# Patient Record
Sex: FEMALE | Race: WHITE | NOT HISPANIC OR LATINO | Employment: OTHER | URBAN - METROPOLITAN AREA
[De-identification: names, ages, dates, MRNs, and addresses within clinical notes are randomized per-mention and may not be internally consistent; named-entity substitution may affect disease eponyms.]

---

## 2019-04-03 ENCOUNTER — TELEPHONE (OUTPATIENT)
Dept: ENDOCRINOLOGY | Facility: HOSPITAL | Age: 79
End: 2019-04-03

## 2019-04-03 ENCOUNTER — OFFICE VISIT (OUTPATIENT)
Dept: ENDOCRINOLOGY | Facility: HOSPITAL | Age: 79
End: 2019-04-03
Payer: MEDICARE

## 2019-04-03 VITALS
DIASTOLIC BLOOD PRESSURE: 74 MMHG | HEART RATE: 100 BPM | WEIGHT: 227.8 LBS | SYSTOLIC BLOOD PRESSURE: 120 MMHG | HEIGHT: 63 IN | BODY MASS INDEX: 40.36 KG/M2

## 2019-04-03 DIAGNOSIS — E21.3 HYPERPARATHYROIDISM (HCC): Primary | ICD-10-CM

## 2019-04-03 DIAGNOSIS — E55.9 VITAMIN D DEFICIENCY: ICD-10-CM

## 2019-04-03 DIAGNOSIS — M85.80 OSTEOPENIA, UNSPECIFIED LOCATION: ICD-10-CM

## 2019-04-03 PROCEDURE — 99204 OFFICE O/P NEW MOD 45 MIN: CPT | Performed by: INTERNAL MEDICINE

## 2019-04-03 RX ORDER — LANOLIN ALCOHOL/MO/W.PET/CERES
CREAM (GRAM) TOPICAL DAILY
COMMUNITY

## 2019-04-03 RX ORDER — ATORVASTATIN CALCIUM 20 MG/1
20 TABLET, FILM COATED ORAL DAILY
COMMUNITY

## 2019-04-03 RX ORDER — SPIRONOLACTONE 25 MG/1
25 TABLET ORAL DAILY
COMMUNITY

## 2019-04-03 RX ORDER — FUROSEMIDE 20 MG/1
20 TABLET ORAL DAILY
COMMUNITY

## 2019-04-03 RX ORDER — MELATONIN
1000 DAILY
COMMUNITY

## 2019-04-10 DIAGNOSIS — M85.80 OSTEOPENIA, UNSPECIFIED LOCATION: ICD-10-CM

## 2019-04-10 DIAGNOSIS — E21.3 HYPERPARATHYROIDISM (HCC): ICD-10-CM

## 2019-08-26 LAB — HBA1C MFR BLD HPLC: 6.3 %

## 2019-10-03 ENCOUNTER — OFFICE VISIT (OUTPATIENT)
Dept: ENDOCRINOLOGY | Facility: HOSPITAL | Age: 79
End: 2019-10-03
Payer: MEDICARE

## 2019-10-03 VITALS
SYSTOLIC BLOOD PRESSURE: 144 MMHG | HEART RATE: 80 BPM | DIASTOLIC BLOOD PRESSURE: 84 MMHG | WEIGHT: 210 LBS | HEIGHT: 63 IN | BODY MASS INDEX: 37.21 KG/M2

## 2019-10-03 DIAGNOSIS — E21.3 HYPERPARATHYROIDISM (HCC): Primary | ICD-10-CM

## 2019-10-03 DIAGNOSIS — E55.9 VITAMIN D DEFICIENCY: ICD-10-CM

## 2019-10-03 PROCEDURE — 99214 OFFICE O/P EST MOD 30 MIN: CPT | Performed by: INTERNAL MEDICINE

## 2019-10-03 RX ORDER — ALBUTEROL SULFATE 2.5 MG/3ML
SOLUTION RESPIRATORY (INHALATION)
Refills: 1 | COMMUNITY
Start: 2019-07-19

## 2019-10-03 NOTE — PROGRESS NOTES
10/3/2019    Assessment/Plan      Diagnoses and all orders for this visit:    Hyperparathyroidism ([address] )    Vitamin D deficiency        Assessment/Plan:  Hyperparathyroidism and vitamin-D deficiency:  I will acquire recent labs done at [address] last month  If they did not do a PTH level, I provided the patient with a PTH lab slip and we will call her to instructor to get this done  Did do well the labs needed, we are good for another 6 months and I provided her a lab slip to have a CMP, 25 hydroxy vitamin-D, PTH prior to that appointment  In, 2021 he may want to repeat kidney imaging as well as DEXA scan  CC:  Follow-up    History of Present Illness     HPI: Jesu Alonso is a 78y o  year old female with history of diabetes, hypertension, hyperlipidemia follows with her PCP for these presents for follow-up of hyperparathyroidism  She has history of kidney stones and in December of 2012 she underwent neck exploration with the right lower parathyroid resected  Surgical pathology suggested hypercellular parathyroid gland and normal parathyroid gland tissue consistent with adenoma  This was done at [facility]   I do not see that intraoperative PTH monitoring was performed  She also had an initial parathyroid operation in 1999  In August of 2015, PTH was mildly elevated at 73 and in June of 2017 PTH was normal at 55  DEXA scan in April 2019 showed osteopenia but no osteoporosis  She presents today overall feeling well  She continues to follow with Urology for history of kidney stones  Review of Systems   Constitutional: Negative for fatigue  HENT: Negative for trouble swallowing and voice change  Eyes: Negative for visual disturbance  Respiratory: Negative for shortness of breath  Cardiovascular: Negative for palpitations and leg swelling  Gastrointestinal: Negative for abdominal pain, nausea and vomiting  Endocrine: Negative for polydipsia and polyuria     Musculoskeletal: Negative for arthralgias and myalgias  Skin: Negative for rash  Neurological: Negative for dizziness, tremors and weakness  Hematological: Negative for adenopathy  Psychiatric/Behavioral: Negative for agitation and confusion  Historical Information   No past medical history on file  No past surgical history on file  Social History   Social History     Substance and Sexual Activity   Alcohol Use Not on file     Social History     Substance and Sexual Activity   Drug Use Not on file     Social History     Tobacco Use   Smoking Status Former Smoker    Last attempt to quit: 1986    Years since quittin 7   Smokeless Tobacco Never Used     Family History:   Family History   Problem Relation Age of Onset    Heart disease Mother     Cancer Father     Brain cancer Sister     Breast cancer Sister        Meds/Allergies   Current Outpatient Medications   Medication Sig Dispense Refill    atorvastatin (LIPITOR) 20 mg tablet Take 20 mg by mouth daily      cholecalciferol (VITAMIN D3) 1,000 units tablet Take 1,000 Units by mouth daily      cyanocobalamin (VITAMIN B-12) 1,000 mcg tablet Take by mouth daily      furosemide (LASIX) 20 mg tablet Take 20 mg by mouth daily      NON FORMULARY Lemon Grass Oil      spironolactone (ALDACTONE) 25 mg tablet Take 25 mg by mouth daily       No current facility-administered medications for this visit  No Known Allergies    Objective   Vitals: There were no vitals taken for this visit  Invasive Devices     None                 Physical Exam   Constitutional: She is oriented to person, place, and time  She appears well-developed and well-nourished  No distress  HENT:   Head: Normocephalic and atraumatic  Neck: Normal range of motion  Neck supple  No thyromegaly present  Cardiovascular: Normal rate and regular rhythm  Pulmonary/Chest: Effort normal and breath sounds normal  No respiratory distress  Abdominal: Soft   Bowel sounds are normal  Musculoskeletal: Normal range of motion  She exhibits no edema  Neurological: She is alert and oriented to person, place, and time  She exhibits normal muscle tone  Skin: Skin is warm and dry  No rash noted  She is not diaphoretic  Psychiatric: She has a normal mood and affect  Her behavior is normal    Vitals reviewed  The history was obtained from the review of the chart and from the patient  Lab Results:      Labs from [address] [place] [address] on 04/13/2019:  Twenty-four urine collection for calcium showed 238 mg per 24 hours with a total volume of 2100 cc  Labs from 04/08/2019 at [address] [place] [address] showed an ionized calcium 5 0, 1, 25 hydroxy vitamin-D was 41, 25 hydroxy vitamin-D was 44 9, calcium was 9 1, albumin 4 1, sodium 142, potassium 4 4, BUN 13, creatinine 0 79, GFR greater than 60, liver function within normal limits  Future Appointments   Date Time Provider Bryn Desai   10/3/2019  9:50 AM DO MACK [facility]       Portions of the record may have been created with voice recognition software  Occasional wrong word or "sound a like" substitutions may have occurred due to the inherent limitations of voice recognition software  Read the chart carefully and recognize, using context, where substitutions have occurred

## 2019-10-04 ENCOUNTER — TELEPHONE (OUTPATIENT)
Dept: ENDOCRINOLOGY | Facility: HOSPITAL | Age: 79
End: 2019-10-04

## 2019-10-04 NOTE — TELEPHONE ENCOUNTER
Please let patient know I got her labs from 08/26  PTH was checked and was elevated at 124  Because calcium remains normal in she is feeling well, we can continue to monitor over time  Repeat labs as planned prior to her next appointment in 6 months  She should have lab slips that a ordered at her recent appt  Labs from [facility] on 8/26/19:  WBC 5 6, Hgb 16, Hct 49 9, Plt 243, Na 144, K 4 2, BUN 12, Cr 0 83, GFR >60, Ca 9 8, Alb 4 3, LFTs WNL, Chol 189, , , HDL 45, A1C 6 3, TSH 2 66,  (14-64)

## 2022-05-17 ENCOUNTER — OFFICE VISIT (OUTPATIENT)
Dept: ENDOCRINOLOGY | Facility: HOSPITAL | Age: 82
End: 2022-05-17
Payer: MEDICARE

## 2022-05-17 VITALS
HEIGHT: 63 IN | DIASTOLIC BLOOD PRESSURE: 80 MMHG | WEIGHT: 203 LBS | SYSTOLIC BLOOD PRESSURE: 124 MMHG | BODY MASS INDEX: 35.97 KG/M2 | HEART RATE: 92 BPM

## 2022-05-17 DIAGNOSIS — E21.3 HYPERPARATHYROIDISM (HCC): Primary | ICD-10-CM

## 2022-05-17 DIAGNOSIS — E55.9 VITAMIN D DEFICIENCY: ICD-10-CM

## 2022-05-17 PROCEDURE — 99214 OFFICE O/P EST MOD 30 MIN: CPT | Performed by: NURSE PRACTITIONER

## 2022-05-17 RX ORDER — SIMVASTATIN 40 MG
40 TABLET ORAL DAILY
COMMUNITY
Start: 2022-04-14

## 2022-05-17 NOTE — PATIENT INSTRUCTIONS
Obtain DEXA Scan when possible  We will contact you with a review of recent lab work once it is received

## 2022-05-17 NOTE — PROGRESS NOTES
Jesu Alonso 80 y o  female MRN: [id number]    Encounter: [id number]      Assessment/Plan     Assessment: This is a 80y o -year-old female with hyperparathyroidism and vitamin-D deficiency  Plan:  1  Hyperparathyroidism:  There is no recent lab work  I will obtain his from her PCP and contact her with the results and a plan for further evaluation and/or treatment  Other than brittle nails, she denies any signs and symptoms of hyperparathyroidism/hypercalcemia  Check PTH, CMP and phosphorus prior to next office visit  2  Vitamin-D deficiency:  No recent lab work  She continues to supplement with 1000 units of vitamin D3 daily  Check 25 hydroxy vitamin-D level prior to next visit  3   Osteopenia:  She is currently due for DEXA scan which was ordered today  CC:  Hyperparathyroidism follow-up    History of Present Illness     HPI:  80y o  year old female with history of diabetes, hypertension, hyperlipidemia follows with her PCP for these presents for follow-up of hyperparathyroidism  She has history of kidney stones and in December of 2012 she underwent neck exploration with the right lower parathyroid resected  Surgical pathology suggested hypercellular parathyroid gland and normal parathyroid gland tissue consistent with adenoma  This was completed at [facility]    She also had an initial parathyroid operation in [address] [place] [address]  In August of 2015, PTH was mildly elevated at 73 and in June of 2017 PTH was normal at 55  DEXA scan in April 2019 showed osteopenia but no osteoporosis  She presents today overall feeling well  She continues to follow with Urology for history of kidney stones  Review of Systems   Constitutional: Negative  Negative for chills, fatigue and fever  HENT: Negative  Negative for trouble swallowing and voice change  Eyes: Negative  Negative for visual disturbance  Respiratory: Negative  Negative for chest tightness and shortness of breath      Cardiovascular: Negative  Negative for chest pain and palpitations  Gastrointestinal: Negative  Negative for abdominal pain, constipation, diarrhea and vomiting  Endocrine: Negative for cold intolerance, heat intolerance, polydipsia, polyphagia and polyuria  Genitourinary: Negative  Musculoskeletal: Negative  Skin: Negative  Brittle nails   Allergic/Immunologic: Negative  Neurological: Negative  Negative for dizziness, syncope, light-headedness and headaches  Hematological: Negative  Psychiatric/Behavioral: Negative  All other systems reviewed and are negative  Historical Information   No past medical history on file  No past surgical history on file  Social History   Social History     Substance and Sexual Activity   Alcohol Use None     Social History     Substance and Sexual Activity   Drug Use Not on file     Social History     Tobacco Use   Smoking Status Former Smoker    Quit date: 1986    Years since quittin 3   Smokeless Tobacco Never Used     Family History:   Family History   Problem Relation Age of Onset    Heart disease Mother     Cancer Father     Brain cancer Sister     Breast cancer Sister        Meds/Allergies   Current Outpatient Medications   Medication Sig Dispense Refill    albuterol (2 5 mg/3 mL) 0 083 % nebulizer solution inhale ONE vial via nebulizer FOUR TIMES DAILY AS NEEDED  1    atorvastatin (LIPITOR) 20 mg tablet Take 20 mg by mouth daily      cholecalciferol (VITAMIN D3) 1,000 units tablet Take 1,000 Units by mouth daily      cyanocobalamin (VITAMIN B-12) 1,000 mcg tablet Take by mouth daily      furosemide (LASIX) 20 mg tablet Take 20 mg by mouth daily      NON FORMULARY Lemon Grass Oil      spironolactone (ALDACTONE) 25 mg tablet Take 25 mg by mouth daily       No current facility-administered medications for this visit       No Known Allergies    Objective   Vitals: Blood pressure 124/80, pulse 92, height 5' 3" (1 6 m), weight 92 1 kg (203 lb)  Physical Exam  Vitals reviewed  Constitutional:       Appearance: She is well-developed  HENT:      Head: Normocephalic and atraumatic  Eyes:      Conjunctiva/sclera: Conjunctivae normal       Pupils: Pupils are equal, round, and reactive to light  Cardiovascular:      Rate and Rhythm: Normal rate and regular rhythm  Heart sounds: Normal heart sounds  Pulmonary:      Effort: Pulmonary effort is normal       Breath sounds: Normal breath sounds  Abdominal:      General: Bowel sounds are normal       Palpations: Abdomen is soft  Musculoskeletal:         General: Normal range of motion  Cervical back: Normal range of motion and neck supple  Skin:     General: Skin is warm and dry  Neurological:      Mental Status: She is alert and oriented to person, place, and time  Psychiatric:         Behavior: Behavior normal          Thought Content: Thought content normal          Judgment: Judgment normal            Results for orders placed in visit on 04/10/19    DXA bone density spine hip and pelvis      Portions of the record may have been created with voice recognition software  Occasional wrong word or "sound a like" substitutions may have occurred due to the inherent limitations of voice recognition software  Read the chart carefully and recognize, using context, where substitutions have occurred

## 2022-06-01 ENCOUNTER — TELEPHONE (OUTPATIENT)
Dept: ENDOCRINOLOGY | Facility: HOSPITAL | Age: 82
End: 2022-06-01

## 2022-06-02 NOTE — TELEPHONE ENCOUNTER
Tejal Ngo's recent DEXA scan performed at [facility] on May 31, 2022  Lumbar sacral spine remains normal in density  Right hip and right forearm continue to show osteopenia  Will further discuss at upcoming office visit with updated lab work

## 2022-06-03 DIAGNOSIS — E55.9 VITAMIN D DEFICIENCY: ICD-10-CM

## 2022-06-03 DIAGNOSIS — E21.3 HYPERPARATHYROIDISM (HCC): ICD-10-CM

## 2022-09-15 ENCOUNTER — OFFICE VISIT (OUTPATIENT)
Dept: ENDOCRINOLOGY | Facility: HOSPITAL | Age: 82
End: 2022-09-15
Payer: MEDICARE

## 2022-09-15 VITALS
BODY MASS INDEX: 37.88 KG/M2 | SYSTOLIC BLOOD PRESSURE: 130 MMHG | HEART RATE: 76 BPM | DIASTOLIC BLOOD PRESSURE: 88 MMHG | WEIGHT: 213.8 LBS | HEIGHT: 63 IN

## 2022-09-15 DIAGNOSIS — E55.9 VITAMIN D DEFICIENCY: ICD-10-CM

## 2022-09-15 DIAGNOSIS — E21.3 HYPERPARATHYROIDISM (HCC): Primary | ICD-10-CM

## 2022-09-15 PROCEDURE — 99214 OFFICE O/P EST MOD 30 MIN: CPT | Performed by: NURSE PRACTITIONER

## 2022-09-15 NOTE — PROGRESS NOTES
Jarekhouston Corral 80 y o  female MRN: [id number]    Encounter: [id number]      Assessment/Plan     Assessment: This is a 80y o -year-old female with hyperparathyroidism and vitamin-D deficiency  Plan:  1  Hyperparathyroidism:  PTH unavailable at the time of the visit  Calcium is normal   I will obtain his from her PCP and contact her with the results and a plan for further evaluation and/or treatment  Other than brittle nails, she denies any signs and symptoms of hyperparathyroidism/hypercalcemia  We will contact her with the results of her PTH lab work  Check PTH, CMP and phosphorus prior to next office visit      2  Vitamin-D deficiency:  Most recent vitamin-D level is sufficient at 51  She continues to supplement with 1000 units of vitamin D3 daily  Check 25 hydroxy vitamin-D level prior to next visit      3  Osteopenia: Recent DEXA scan  CC: Hyperparathyroidism follow-up    History of Present Illness     HPI:  80 y  o  year old female with history of diabetes, hypertension, hyperlipidemia follows with her PCP for these presents for follow-up of hyperparathyroidism  Víctor Varela has history of kidney stones and in December of 2012 she underwent neck exploration with the right lower parathyroid resected   Surgical pathology suggested hypercellular parathyroid gland and normal parathyroid gland tissue consistent with adenoma  This was completed at [facility]   Víctor Varela also had an initial parathyroid operation in [address] [place] [address]  Most recent calcium level from September 12, 2022 is 9 8 with an albumin of 4 0  Her vitamin-D level 51 8  DEXA scan in June 2022 showed osteopenia but no osteoporosis  She presents today overall feeling well   She continues to follow with Urology for history of kidney stones  Review of Systems   Constitutional: Negative  Negative for chills, fatigue and fever  HENT: Negative  Negative for trouble swallowing and voice change  Eyes: Negative  Negative for visual disturbance  Respiratory: Negative  Negative for chest tightness and shortness of breath  Cardiovascular: Negative  Negative for chest pain  Gastrointestinal: Negative  Negative for abdominal pain, constipation, diarrhea and vomiting  Endocrine: Negative for cold intolerance, heat intolerance, polydipsia, polyphagia and polyuria  Genitourinary: Negative  Musculoskeletal: Negative  Skin: Negative  Allergic/Immunologic: Negative  Neurological: Negative  Negative for dizziness, syncope, light-headedness and headaches  Hematological: Negative  Psychiatric/Behavioral: Negative  All other systems reviewed and are negative  Historical Information   No past medical history on file  No past surgical history on file    Social History   Social History     Substance and Sexual Activity   Alcohol Use None     Social History     Substance and Sexual Activity   Drug Use Not on file     Social History     Tobacco Use   Smoking Status Former Smoker    Quit date: 1986    Years since quittin 6   Smokeless Tobacco Never Used     Family History:   Family History   Problem Relation Age of Onset    Heart disease Mother     Cancer Father     Brain cancer Sister     Breast cancer Sister        Meds/Allergies   Current Outpatient Medications   Medication Sig Dispense Refill    albuterol (2 5 mg/3 mL) 0 083 % nebulizer solution inhale ONE vial via nebulizer FOUR TIMES DAILY AS NEEDED (Patient not taking: Reported on 2022)  1    atorvastatin (LIPITOR) 20 mg tablet Take 20 mg by mouth daily (Patient not taking: Reported on 2022)      cholecalciferol (VITAMIN D3) 1,000 units tablet Take 1,000 Units by mouth daily      cyanocobalamin (VITAMIN B-12) 1,000 mcg tablet Take by mouth daily      furosemide (LASIX) 20 mg tablet Take 20 mg by mouth daily (Patient not taking: Reported on 2022)      NON FORMULARY Lemon Grass Oil      simvastatin (ZOCOR) 40 mg tablet Take 40 mg by mouth in the morning   spironolactone (ALDACTONE) 25 mg tablet Take 25 mg by mouth daily (Patient not taking: Reported on 5/17/2022)       No current facility-administered medications for this visit  No Known Allergies    Objective   Vitals: There were no vitals taken for this visit  Physical Exam  Vitals reviewed  Constitutional:       Appearance: She is well-developed  HENT:      Head: Normocephalic and atraumatic  Eyes:      Conjunctiva/sclera: Conjunctivae normal       Pupils: Pupils are equal, round, and reactive to light  Cardiovascular:      Rate and Rhythm: Normal rate and regular rhythm  Heart sounds: Normal heart sounds  Pulmonary:      Effort: Pulmonary effort is normal       Breath sounds: Normal breath sounds  Abdominal:      General: Bowel sounds are normal       Palpations: Abdomen is soft  Musculoskeletal:         General: Normal range of motion  Cervical back: Normal range of motion and neck supple  Skin:     General: Skin is warm and dry  Neurological:      Mental Status: She is alert and oriented to person, place, and time  Psychiatric:         Behavior: Behavior normal          Thought Content: Thought content normal          Judgment: Judgment normal        Portions of the record may have been created with voice recognition software  Occasional wrong word or "sound a like" substitutions may have occurred due to the inherent limitations of voice recognition software  Read the chart carefully and recognize, using context, where substitutions have occurred

## 2022-09-15 NOTE — PATIENT INSTRUCTIONS
We will contact you with a review of recent lab work once it is received  Obtain lab work prior to next visit

## 2022-09-19 ENCOUNTER — TELEPHONE (OUTPATIENT)
Dept: ENDOCRINOLOGY | Facility: HOSPITAL | Age: 82
End: 2022-09-19

## 2022-09-21 NOTE — TELEPHONE ENCOUNTER
Patient called and said that she was expecting a call back regarding her test results  She is very anxious  Please call back as soon as possible even if it is no news

## 2022-09-22 DIAGNOSIS — E21.3 HYPERPARATHYROIDISM (HCC): Primary | ICD-10-CM

## 2022-09-22 NOTE — TELEPHONE ENCOUNTER
PTH is elevated at 101 with a normal calcium  Given her history of kidney stones, I will put in for a nuclear medicine scan of her parathyroids